# Patient Record
(demographics unavailable — no encounter records)

---

## 2025-07-28 NOTE — ASSESSMENT
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

## 2025-07-28 NOTE — HISTORY OF PRESENT ILLNESS
[FreeTextEntry1] : RPA- SKIN CEHCK [de-identified] : Nixon 43yo M presents for skin check   Lists the following concerns today: spot on forehead and chest  Personal history of skin cancer: none but + hx dysplastic nevus s/p excision on scalp Family history of skin cancer: +hx melanoma  Note: pt has [place] syndrome s/p colectomy for colon cancer, also s/p chemo